# Patient Record
Sex: FEMALE | Race: BLACK OR AFRICAN AMERICAN | Employment: FULL TIME | URBAN - METROPOLITAN AREA
[De-identification: names, ages, dates, MRNs, and addresses within clinical notes are randomized per-mention and may not be internally consistent; named-entity substitution may affect disease eponyms.]

---

## 2019-03-16 ENCOUNTER — HOSPITAL ENCOUNTER (EMERGENCY)
Age: 29
Discharge: HOME | End: 2019-03-16
Payer: COMMERCIAL

## 2019-03-16 VITALS
BODY MASS INDEX: 57.52 KG/M2 | DIASTOLIC BLOOD PRESSURE: 81 MMHG | HEART RATE: 91 BPM | WEIGHT: 293 LBS | RESPIRATION RATE: 18 BRPM | OXYGEN SATURATION: 99 % | SYSTOLIC BLOOD PRESSURE: 127 MMHG | HEIGHT: 60 IN | TEMPERATURE: 97 F

## 2019-03-16 DIAGNOSIS — M79.605 LEFT LEG PAIN: Primary | ICD-10-CM

## 2019-03-16 LAB
ANION GAP SERPL CALCULATED.3IONS-SCNC: 15 MMOL/L (ref 3–16)
BASOPHILS ABSOLUTE: 0.1 K/UL (ref 0–0.2)
BASOPHILS RELATIVE PERCENT: 1.3 %
BILIRUBIN URINE: NEGATIVE
BLOOD, URINE: ABNORMAL
BUN BLDV-MCNC: 13 MG/DL (ref 7–20)
CALCIUM SERPL-MCNC: 9 MG/DL (ref 8.3–10.6)
CHLORIDE BLD-SCNC: 105 MMOL/L (ref 99–110)
CLARITY: ABNORMAL
CO2: 22 MMOL/L (ref 21–32)
COLOR: ABNORMAL
COMMENT UA: ABNORMAL
CREAT SERPL-MCNC: 0.6 MG/DL (ref 0.6–1.1)
D DIMER: 1855 NG/ML DDU (ref 0–229)
EOSINOPHILS ABSOLUTE: 0.1 K/UL (ref 0–0.6)
EOSINOPHILS RELATIVE PERCENT: 1.2 %
EPITHELIAL CELLS, UA: 13 /HPF (ref 0–5)
GFR AFRICAN AMERICAN: >60
GFR NON-AFRICAN AMERICAN: >60
GLUCOSE BLD-MCNC: 103 MG/DL (ref 70–99)
GLUCOSE URINE: NEGATIVE MG/DL
HCG(URINE) PREGNANCY TEST: NEGATIVE
HCT VFR BLD CALC: 37.9 % (ref 36–48)
HEMOGLOBIN: 11.8 G/DL (ref 12–16)
KETONES, URINE: NEGATIVE MG/DL
LEUKOCYTE ESTERASE, URINE: ABNORMAL
LYMPHOCYTES ABSOLUTE: 2.4 K/UL (ref 1–5.1)
LYMPHOCYTES RELATIVE PERCENT: 30.7 %
MCH RBC QN AUTO: 26 PG (ref 26–34)
MCHC RBC AUTO-ENTMCNC: 31 G/DL (ref 31–36)
MCV RBC AUTO: 83.8 FL (ref 80–100)
MICROSCOPIC EXAMINATION: YES
MONOCYTES ABSOLUTE: 0.5 K/UL (ref 0–1.3)
MONOCYTES RELATIVE PERCENT: 6.4 %
NEUTROPHILS ABSOLUTE: 4.7 K/UL (ref 1.7–7.7)
NEUTROPHILS RELATIVE PERCENT: 60.4 %
NITRITE, URINE: NEGATIVE
PDW BLD-RTO: 16.4 % (ref 12.4–15.4)
PH UA: 5.5 (ref 5–8)
PLATELET # BLD: 263 K/UL (ref 135–450)
PMV BLD AUTO: 8 FL (ref 5–10.5)
POTASSIUM SERPL-SCNC: 4 MMOL/L (ref 3.5–5.1)
PROTEIN UA: 100 MG/DL
RBC # BLD: 4.53 M/UL (ref 4–5.2)
RBC UA: 527 /HPF (ref 0–4)
SODIUM BLD-SCNC: 142 MMOL/L (ref 136–145)
SPECIFIC GRAVITY UA: >=1.03 (ref 1–1.03)
URINE REFLEX TO CULTURE: YES
URINE TYPE: ABNORMAL
UROBILINOGEN, URINE: 0.2 E.U./DL
WBC # BLD: 7.8 K/UL (ref 4–11)
WBC UA: 68 /HPF (ref 0–5)

## 2019-03-16 PROCEDURE — 6370000000 HC RX 637 (ALT 250 FOR IP): Performed by: NURSE PRACTITIONER

## 2019-03-16 PROCEDURE — 87086 URINE CULTURE/COLONY COUNT: CPT

## 2019-03-16 PROCEDURE — 80048 BASIC METABOLIC PNL TOTAL CA: CPT

## 2019-03-16 PROCEDURE — 99283 EMERGENCY DEPT VISIT LOW MDM: CPT

## 2019-03-16 PROCEDURE — 81001 URINALYSIS AUTO W/SCOPE: CPT

## 2019-03-16 PROCEDURE — 84703 CHORIONIC GONADOTROPIN ASSAY: CPT

## 2019-03-16 PROCEDURE — 85025 COMPLETE CBC W/AUTO DIFF WBC: CPT

## 2019-03-16 PROCEDURE — 85379 FIBRIN DEGRADATION QUANT: CPT

## 2019-03-16 RX ORDER — TRAMADOL HYDROCHLORIDE 50 MG/1
50 TABLET ORAL EVERY 6 HOURS PRN
Qty: 15 TABLET | Refills: 0 | Status: SHIPPED | OUTPATIENT
Start: 2019-03-16 | End: 2019-03-19

## 2019-03-16 RX ADMIN — APIXABAN 10 MG: 5 TABLET, FILM COATED ORAL at 19:10

## 2019-03-16 RX ADMIN — APIXABAN 80 MG: 5 TABLET, FILM COATED ORAL at 19:10

## 2019-03-16 ASSESSMENT — ENCOUNTER SYMPTOMS
ABDOMINAL DISTENTION: 0
BACK PAIN: 0
COLOR CHANGE: 0
ABDOMINAL PAIN: 0
SHORTNESS OF BREATH: 0

## 2019-03-16 ASSESSMENT — PAIN DESCRIPTION - ONSET
ONSET: ON-GOING

## 2019-03-16 ASSESSMENT — PAIN DESCRIPTION - FREQUENCY
FREQUENCY: CONTINUOUS

## 2019-03-16 ASSESSMENT — PAIN DESCRIPTION - DESCRIPTORS
DESCRIPTORS: ACHING

## 2019-03-16 ASSESSMENT — PAIN DESCRIPTION - LOCATION
LOCATION: LEG
LOCATION: LEG

## 2019-03-16 ASSESSMENT — PAIN SCALES - GENERAL
PAINLEVEL_OUTOF10: 8
PAINLEVEL_OUTOF10: 6
PAINLEVEL_OUTOF10: 8

## 2019-03-16 ASSESSMENT — PAIN DESCRIPTION - ORIENTATION
ORIENTATION: LEFT;UPPER

## 2019-03-16 ASSESSMENT — PAIN DESCRIPTION - PAIN TYPE
TYPE: ACUTE PAIN

## 2019-03-18 ENCOUNTER — HOSPITAL ENCOUNTER (OUTPATIENT)
Dept: VASCULAR LAB | Age: 29
Discharge: HOME | End: 2019-03-18
Payer: COMMERCIAL

## 2019-03-18 ENCOUNTER — ANTI-COAG VISIT (OUTPATIENT)
Dept: PHARMACY | Age: 29
End: 2019-03-18
Payer: COMMERCIAL

## 2019-03-18 DIAGNOSIS — M79.605 LEFT LEG PAIN: ICD-10-CM

## 2019-03-18 LAB — URINE CULTURE, ROUTINE: NORMAL

## 2019-03-18 PROCEDURE — 99211 OFF/OP EST MAY X REQ PHY/QHP: CPT

## 2019-03-18 PROCEDURE — 93971 EXTREMITY STUDY: CPT

## 2019-03-18 RX ORDER — NORETHINDRONE ACETATE AND ETHINYL ESTRADIOL 1MG-20(21)
1 KIT ORAL DAILY
COMMUNITY
End: 2019-03-21 | Stop reason: ALTCHOICE

## 2019-03-19 ENCOUNTER — TELEPHONE (OUTPATIENT)
Dept: CARDIOLOGY CLINIC | Age: 29
End: 2019-03-19

## 2019-03-21 ENCOUNTER — OFFICE VISIT (OUTPATIENT)
Dept: CARDIOLOGY CLINIC | Age: 29
End: 2019-03-21
Payer: COMMERCIAL

## 2019-03-21 VITALS
HEIGHT: 60 IN | SYSTOLIC BLOOD PRESSURE: 124 MMHG | WEIGHT: 293 LBS | BODY MASS INDEX: 57.52 KG/M2 | DIASTOLIC BLOOD PRESSURE: 78 MMHG | HEART RATE: 98 BPM

## 2019-03-21 DIAGNOSIS — I82.413 ACUTE DEEP VEIN THROMBOSIS (DVT) OF FEMORAL VEIN OF BOTH LOWER EXTREMITIES (HCC): Primary | ICD-10-CM

## 2019-03-21 PROBLEM — I82.419 ACUTE DEEP VEIN THROMBOSIS (DVT) OF FEMORAL VEIN (HCC): Status: ACTIVE | Noted: 2019-03-21

## 2019-03-21 PROCEDURE — 93000 ELECTROCARDIOGRAM COMPLETE: CPT | Performed by: INTERNAL MEDICINE

## 2019-03-21 PROCEDURE — 99204 OFFICE O/P NEW MOD 45 MIN: CPT | Performed by: INTERNAL MEDICINE

## 2019-03-28 ENCOUNTER — HOSPITAL ENCOUNTER (OUTPATIENT)
Dept: VASCULAR LAB | Age: 29
Discharge: HOME | End: 2019-03-28
Payer: COMMERCIAL

## 2019-03-28 DIAGNOSIS — I82.413 ACUTE DEEP VEIN THROMBOSIS (DVT) OF FEMORAL VEIN OF BOTH LOWER EXTREMITIES (HCC): ICD-10-CM

## 2019-03-28 PROCEDURE — 93971 EXTREMITY STUDY: CPT

## 2019-03-29 ENCOUNTER — TELEPHONE (OUTPATIENT)
Dept: CARDIOLOGY CLINIC | Age: 29
End: 2019-03-29

## 2019-03-29 NOTE — TELEPHONE ENCOUNTER
Discomfort/uncomfortable and feels a ton better than 1 week ago. She remains on Eliquis and I have instructed her never to stop without Dr. Hill Six instructions. Explained that I spoke with Dr. Caitlin Maldonado and reviewed her venous duplex with him. He would like to proceed next week with (see below)   Instructed her that I will facilitate and call her on Monday with all the information. 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 She vu and at this time, does not have any q/c. Venogram, tPA, EKOS -2 DAY Procedure (approx. 2 hr each)    Tuesday 4/2/19 and Wednesday 4/3/19    The morning of your procedure you will park in the hospital parking lot and report directly to the cath lab to check in.     Pre-Procedure Instructions   1. You will need to fast for at least 8 hours prior to procedure. 2. No caffeine the morning of.   3. You will need to hold your anticoagulation, ELIQUIS for 2 days prior. 4. Do not use any lotions, creams or perfume the morning of procedure. 5. Pre-procedure lab work will need to be completed 5-7 days prior to procedure. Last 3/16/19-stable,   6. Please have a responsible adult to drive you home after procedure. Depending on procedure you may require an overnight stay. 7. Cath lab will provide you with all post procedure instructions. If you have any questions regarding the procedure itself or medications, please call [phone] and ask to speak with a nurse.

## 2019-03-29 NOTE — TELEPHONE ENCOUNTER
Results final in Epic. Called patient. Informed her that we would call her back once her test result is reviewed by Dr. Cheyanne Belcher. Let her know that it might not be today. Patient verbalized and confirmed understanding.

## 2019-03-29 NOTE — TELEPHONE ENCOUNTER
Dr. Katy Nation had instructed me to contact him when this was resulted. Awaiting to hear back from him.

## 2019-04-01 NOTE — TELEPHONE ENCOUNTER
Spoke with Dr. Jose R Ma this morning and discussed her update Friday prior to me leaving (see below) and again this morning-pelvis pain down to 1/10, notices more if sitting to long but she threw a baby shower this weekend and was on her feet most of the day Saturday and Sunday and noticed an increase in pain in her thigh, knee and ankle along with increase in swelling. He would like to not proceed with the procedure and continue to monitor with another update this Wed/Thurs. I contacted patient with the above information. Asked her to wear above knee compression stockings daily, go to a medical supply store, if script is needed per insurance, we can send one over. Elevate legs as much as possible. She vu and does not have any further q/c at this time. Remain on Eliquis, do not stop without Dr. Melba Calix instructions. She again vu.

## 2019-04-02 DIAGNOSIS — M79.605 LEFT LEG PAIN: ICD-10-CM

## 2019-04-02 DIAGNOSIS — M79.89 LEG SWELLING: ICD-10-CM

## 2019-04-02 DIAGNOSIS — I82.413 ACUTE DEEP VEIN THROMBOSIS (DVT) OF FEMORAL VEIN OF BOTH LOWER EXTREMITIES (HCC): Primary | ICD-10-CM

## 2019-04-02 NOTE — TELEPHONE ENCOUNTER
Patient is calling to relay that she needs the script to go to [address] (ph. [phone]) and to state DVT 20-30. Pt spoke to Mandi Moreno at [address].

## 2019-04-04 NOTE — TELEPHONE ENCOUNTER
Dr. Alfredo Landing to get an update to see how she is doing. Pelvic pain completely gone. Leg has improved since Monday but continues with edema worsening throughout the day. We sent script for compression stockings and she went to get measured and have not come in yet. She did in the mean time, have an old pair of compression stockings she wore and it did help but could tell that that it helped. She asked if she should contact her old Hematologist from her 1st DVT 2015 post partum. She see's Dr. Cheryl Eldridge, last o.v and release 4/13/2017. I asked her to go ahead and call to give him an full update as to what has transpired to see if any further workup on his side needs completed. Asked her to update me with any further Hem recs. She vu.

## 2019-04-22 ENCOUNTER — TELEPHONE (OUTPATIENT)
Dept: CARDIOLOGY CLINIC | Age: 29
End: 2019-04-22

## 2020-06-26 NOTE — TELEPHONE ENCOUNTER
Discussed with Berenice Fritz RN    Pt may fly but advised to take such precautions such as ROM, walking during the flight, ankle pumps and continuation of medication    Pt notified and verbalized her understanding and importance
Patient called in wanting to know fi she can fly? She stated she will be leaving in July. You can reach this patient at [phone].
verbalization

## 2022-10-11 NOTE — TELEPHONE ENCOUNTER
Ordered and script placed in folder for signature. Than can be faxed to [facility], will need fax number. Cimetidine Counseling:  I discussed with the patient the risks of Cimetidine including but not limited to gynecomastia, headache, diarrhea, nausea, drowsiness, arrhythmias, pancreatitis, skin rashes, psychosis, bone marrow suppression and kidney toxicity.

## 2023-06-19 NOTE — TELEPHONE ENCOUNTER
Please arrange for patient to be seen for follow up in 6 weeks with Dr. Brit Christopher. [Left] : left knee [5___] : hamstring 5[unfilled]/5 [] : no erythema